# Patient Record
Sex: FEMALE | Race: WHITE | NOT HISPANIC OR LATINO | Employment: FULL TIME | RURAL
[De-identification: names, ages, dates, MRNs, and addresses within clinical notes are randomized per-mention and may not be internally consistent; named-entity substitution may affect disease eponyms.]

---

## 2020-04-13 ENCOUNTER — OFFICE VISIT (OUTPATIENT)
Dept: FAMILY MEDICINE CLINIC | Facility: CLINIC | Age: 25
End: 2020-04-13

## 2020-04-13 VITALS
TEMPERATURE: 97.8 F | BODY MASS INDEX: 34.97 KG/M2 | OXYGEN SATURATION: 98 % | DIASTOLIC BLOOD PRESSURE: 77 MMHG | HEIGHT: 64 IN | RESPIRATION RATE: 18 BRPM | HEART RATE: 64 BPM | WEIGHT: 204.8 LBS | SYSTOLIC BLOOD PRESSURE: 114 MMHG

## 2020-04-13 DIAGNOSIS — F41.9 ANXIETY: Primary | ICD-10-CM

## 2020-04-13 DIAGNOSIS — E66.3 OVERWEIGHT: ICD-10-CM

## 2020-04-13 DIAGNOSIS — F17.200 TOBACCO USE DISORDER: ICD-10-CM

## 2020-04-13 PROCEDURE — 99203 OFFICE O/P NEW LOW 30 MIN: CPT | Performed by: FAMILY MEDICINE

## 2020-04-13 RX ORDER — BUSPIRONE HYDROCHLORIDE 7.5 MG/1
TABLET ORAL
Qty: 90 TABLET | Refills: 2 | Status: SHIPPED | OUTPATIENT
Start: 2020-04-13 | End: 2020-06-03 | Stop reason: SDUPTHER

## 2020-04-13 RX ORDER — PROPRANOLOL HYDROCHLORIDE 10 MG/1
10 TABLET ORAL AS NEEDED
COMMUNITY
End: 2020-06-03 | Stop reason: SDUPTHER

## 2020-04-13 NOTE — PROGRESS NOTES
Subjective   Talisha Hernandes is a 24 y.o. female.     Chief Complaint   Patient presents with   • Anxiety       Anxiety   Presents for initial visit. Onset was more than 5 years ago. The problem has been unchanged. Symptoms include nervous/anxious behavior and panic. Patient reports no chest pain, nausea, palpitations, shortness of breath or suicidal ideas. Symptoms occur most days. The patient sleeps 5 hours per night. The quality of sleep is poor. Nighttime awakenings: occasional.     Her past medical history is significant for anxiety/panic attacks. The treatment provided mild relief. Compliance with prior treatments has been good.            I personally reviewed and updated the patient's allergies, medications, problem list, and past medical, surgical, social, and family history.     History reviewed. No pertinent family history.    Social History     Tobacco Use   • Smoking status: Light Tobacco Smoker     Types: Cigarettes   • Smokeless tobacco: Never Used   Substance Use Topics   • Alcohol use: Yes     Frequency: 2-4 times a month     Drinks per session: 1 or 2   • Drug use: Never       Past Surgical History:   Procedure Laterality Date   • TONSILLECTOMY         Patient Active Problem List   Diagnosis   • Anxiety   • Overweight   • Tobacco use disorder         Current Outpatient Medications:   •  propranolol (INDERAL) 10 MG tablet, Take 10 mg by mouth As Needed., Disp: , Rfl:   •  busPIRone (BUSPAR) 7.5 MG tablet, Take 1 tablet 2 to 3 times daily, Disp: 90 tablet, Rfl: 2         Review of Systems   Constitutional: Negative for chills and diaphoresis.   Eyes: Negative for visual disturbance.   Respiratory: Negative for shortness of breath.    Cardiovascular: Negative for chest pain and palpitations.   Gastrointestinal: Negative for abdominal pain and nausea.   Endocrine: Negative for polydipsia and polyphagia.   Musculoskeletal: Negative for neck stiffness.   Skin: Negative for color change and pallor.  "  Neurological: Negative for seizures and syncope.   Hematological: Negative for adenopathy.   Psychiatric/Behavioral: Negative for hallucinations and suicidal ideas. The patient is nervous/anxious.        I have reviewed and confirmed the accuracy of the ROS as documented by the MA/LPN/RN Chad Mcgee MD      Objective   /77   Pulse 64   Temp 97.8 °F (36.6 °C)   Resp 18   Ht 162.6 cm (64\")   Wt 92.9 kg (204 lb 12.8 oz)   LMP 04/10/2020   SpO2 98%   Breastfeeding No   BMI 35.15 kg/m²   Wt Readings from Last 3 Encounters:   04/13/20 92.9 kg (204 lb 12.8 oz)     Physical Exam   Constitutional: She is oriented to person, place, and time. She appears well-developed and well-nourished.   Cardiovascular: Normal rate, regular rhythm, S1 normal, S2 normal, normal heart sounds, intact distal pulses and normal pulses. Exam reveals no gallop and no friction rub.   No murmur heard.  Pulmonary/Chest: Effort normal and breath sounds normal. No accessory muscle usage or stridor. She has no decreased breath sounds. She has no wheezes. She has no rhonchi. She has no rales.   Abdominal: Soft. Normal appearance, normal aorta and bowel sounds are normal. She exhibits no distension, no pulsatile midline mass and no mass. There is no hepatosplenomegaly. There is no tenderness. There is no rigidity, no rebound, no guarding, no CVA tenderness and negative James's sign. No hernia.   Neurological: She is alert and oriented to person, place, and time. Coordination and gait normal.   Skin: Skin is warm and dry. Turgor is normal. She is not diaphoretic. No pallor.         Assessment/Plan       Anxiety.  Improved on as needed propranolol, add BuSpar.  History of depression resolved currently.  Good social support.  Recently moved area, restart counseling.  Follow-up recheck.  Call or return if worsening symptoms.  Check blood work.  Screening for cervical cancer.  On OCPs, has upcoming appointment with OB/GYN.  Tobacco use.  " Encouraged cessation.  Screening for hyperlipidemia.  Check fasting blood work.  Follow-up visit schedule comprehensive physical and screening tests.    Problem List Items Addressed This Visit        Unprioritized    Anxiety - Primary    Overweight    Tobacco use disorder              Expected course, medications, and adverse effects discussed.  Call or return if worsening or persistent symptoms.

## 2020-06-02 PROBLEM — Z00.01 ANNUAL VISIT FOR GENERAL ADULT MEDICAL EXAMINATION WITH ABNORMAL FINDINGS: Status: ACTIVE | Noted: 2020-06-02

## 2020-06-02 NOTE — PROGRESS NOTES
Subjective   Talisha Hernandes is a 24 y.o. female.     Chief Complaint   Patient presents with   • Annual Exam       The patient is here: to discuss health maintenance and disease prevention to follow up on multiple medical problems.  Last comprehensive physical was on unknown.  Overall has: moderate activity with work/home activities, exercises 2 - 3 times per week, good appetite, feels well with no complaints, good energy level and is sleeping poorly. Nutrition: appropriate balanced diet, balanced diet and eating a variety of foods. Last tetanus shot was unknown.     Anxiety   Presents for initial visit. Onset was more than 5 years ago. The problem has been unchanged. Symptoms include nervous/anxious behavior and panic. Patient reports no chest pain, nausea, palpitations, shortness of breath or suicidal ideas. Symptoms occur most days. The patient sleeps 5 hours per night. The quality of sleep is poor. Nighttime awakenings: occasional.     Her past medical history is significant for anxiety/panic attacks. The treatment provided mild relief. Compliance with prior treatments has been good.        Recent Hospitalizations:  No hospitalization(s) within the last year..  ccc      I personally reviewed and updated the patient's allergies, medications, problem list, and past medical, surgical, social, and family history.     Family History   Problem Relation Age of Onset   • Cancer Mother         lymphoma   • Hypertension Father        Social History     Tobacco Use   • Smoking status: Former Smoker     Types: Cigarettes   • Smokeless tobacco: Never Used   Substance Use Topics   • Alcohol use: Yes     Frequency: 2-4 times a month     Drinks per session: 1 or 2   • Drug use: Never       Past Surgical History:   Procedure Laterality Date   • TONSILLECTOMY         Patient Active Problem List   Diagnosis   • Anxiety   • Overweight   • Tobacco use disorder   • Annual visit for general adult medical examination with abnormal  "findings   • Former light tobacco smoker         Current Outpatient Medications:   •  busPIRone (BUSPAR) 7.5 MG tablet, Take 1 tablet 2 to 3 times daily, Disp: 90 tablet, Rfl: 3  •  propranolol (INDERAL) 10 MG tablet, Take 1 tablet daily as needed, Disp: 90 tablet, Rfl: 3  •  tretinoin (RETIN-A) 0.025 % cream, APPLY TO FACE TWICE WEEKLY TO START AS DIRECTED FOR ACNE, Disp: , Rfl:   •  clindamycin (CLEOCIN T) 1 % external solution, , Disp: , Rfl:          Review of Systems   Constitutional: Negative for chills and diaphoresis.   Eyes: Negative for visual disturbance.   Respiratory: Negative for shortness of breath.    Cardiovascular: Negative for chest pain and palpitations.   Gastrointestinal: Negative for abdominal pain and nausea.   Endocrine: Negative for polydipsia and polyphagia.   Musculoskeletal: Negative for neck stiffness.   Skin: Negative for color change and pallor.   Neurological: Negative for seizures and syncope.   Hematological: Negative for adenopathy.   Psychiatric/Behavioral: Negative for hallucinations and suicidal ideas. The patient is nervous/anxious.        I have reviewed and confirmed the accuracy of the ROS as documented by the MA/LPN/RN Chad Mcgee MD      Objective   /76 (BP Location: Right arm, Patient Position: Sitting, Cuff Size: Adult)   Pulse 71   Temp 97.5 °F (36.4 °C)   Resp 18   Ht 162.6 cm (64\")   Wt 91.5 kg (201 lb 12.8 oz)   LMP 05/26/2020   SpO2 98%   BMI 34.64 kg/m²   Wt Readings from Last 3 Encounters:   06/03/20 91.5 kg (201 lb 12.8 oz)   04/13/20 92.9 kg (204 lb 12.8 oz)     Physical Exam   Constitutional: She is oriented to person, place, and time. She appears well-developed and well-nourished.   HENT:   Head: Normocephalic.   Right Ear: Tympanic membrane, external ear and ear canal normal.   Left Ear: Tympanic membrane, external ear and ear canal normal.   Nose: Nose normal.   Eyes: Pupils are equal, round, and reactive to light. Conjunctivae, EOM and " lids are normal.   Neck: No JVD present. Carotid bruit is not present. No tracheal deviation present. No thyromegaly present.   Cardiovascular: Normal rate, regular rhythm, normal heart sounds and intact distal pulses. Exam reveals no gallop and no friction rub.   No murmur heard.  Pulmonary/Chest: Effort normal and breath sounds normal. No stridor. She has no decreased breath sounds. She has no wheezes. She has no rales.   Abdominal: Soft. Bowel sounds are normal. She exhibits no distension and no mass. There is no tenderness. There is no rebound and no guarding. No hernia.   Lymphadenopathy:        Head (right side): No submental, no submandibular, no tonsillar, no preauricular, no posterior auricular and no occipital adenopathy present.        Head (left side): No submental, no submandibular, no tonsillar, no preauricular, no posterior auricular and no occipital adenopathy present.     She has no cervical adenopathy.   Neurological: She is alert and oriented to person, place, and time. She has normal strength and normal reflexes. No cranial nerve deficit or sensory deficit. Coordination and gait normal.   Skin: Skin is warm and dry. Turgor is normal. She is not diaphoretic. No pallor.       Recent Lab Results:    Lab Results   Component Value Date    GLU 84 06/03/2020        Lab Results   Component Value Date    TRIG 52 06/03/2020    HDL 75 06/03/2020    VLDL 10 06/03/2020     LDL Cholesterol    Date Value Ref Range Status   06/03/2020 73 0 - 99 mg/dL Final     No results found for: PSA  Lab Results   Component Value Date    WBC 7.6 06/03/2020    HGB 14.4 06/03/2020    HCT 43.3 06/03/2020    MCV 87 06/03/2020     06/03/2020     Lab Results   Component Value Date    TSH 0.757 06/03/2020     Lab Results   Component Value Date    BUN 13 06/03/2020    CREATININE 0.79 06/03/2020    EGFRIFNONA 105 06/03/2020    EGFRIFAFRI 121 06/03/2020    BCR 16 06/03/2020    K 4.5 06/03/2020    CO2 24 06/03/2020    CALCIUM 9.7  06/03/2020    PROTENTOTREF 7.3 06/03/2020    ALBUMIN 4.9 06/03/2020    LABIL2 2.0 06/03/2020    AST 19 06/03/2020    ALT 16 06/03/2020         Age-appropriate Screening Schedule:  Refer to the list below for future screening recommendations based on patient's age, sex and/or medical conditions. Orders for these recommended tests are listed in the plan section. The patient has been provided with a written plan.    Health Maintenance   Topic Date Due   • TDAP/TD VACCINES (1 - Tdap) 11/06/2006   • CHLAMYDIA SCREENING  03/20/2020   • PAP SMEAR  03/20/2020   • INFLUENZA VACCINE  08/01/2020           Assessment/Plan      Medications        Problem List         LOS    Physical.  Doing well, vaccines current.  Discussed health maintenance, screening test, lifestyle modification.  Anxiety.  Improved on as needed propranolol,  BuSpar.  History of depression resolved currently.  Good social support.  Recently moved area, restart counseling.  Follow-up recheck.  Call or return if worsening symptoms.  Check blood work.  Screening for cervical cancer.  On OCPs, has had appointment with OB/GYN.  Undergoing eval for PCOS, will get records.  Tobacco use.    Congrats on quitting.  Screening for hyperlipidemia.  Check fasting blood work.    Problem List Items Addressed This Visit        Unprioritized    Anxiety    Overweight    Annual visit for general adult medical examination with abnormal findings - Primary    Relevant Orders    POC Urinalysis Dipstick, Automated (Completed)    Former light tobacco smoker      Other Visit Diagnoses     Screening for hyperlipidemia        Relevant Orders    Lipid Panel With / Chol / HDL Ratio (Completed)    Malaise and fatigue        Relevant Orders    CBC & Differential (Completed)    Comprehensive Metabolic Panel (Completed)    TSH (Completed)              Expected course, medications, and adverse effects discussed.  Call or return if worsening or persistent symptoms.

## 2020-06-03 ENCOUNTER — OFFICE VISIT (OUTPATIENT)
Dept: FAMILY MEDICINE CLINIC | Facility: CLINIC | Age: 25
End: 2020-06-03

## 2020-06-03 VITALS
HEIGHT: 64 IN | DIASTOLIC BLOOD PRESSURE: 76 MMHG | WEIGHT: 201.8 LBS | OXYGEN SATURATION: 98 % | SYSTOLIC BLOOD PRESSURE: 111 MMHG | TEMPERATURE: 97.5 F | HEART RATE: 71 BPM | RESPIRATION RATE: 18 BRPM | BODY MASS INDEX: 34.45 KG/M2

## 2020-06-03 DIAGNOSIS — E66.3 OVERWEIGHT: ICD-10-CM

## 2020-06-03 DIAGNOSIS — R53.81 MALAISE AND FATIGUE: ICD-10-CM

## 2020-06-03 DIAGNOSIS — Z00.01 ANNUAL VISIT FOR GENERAL ADULT MEDICAL EXAMINATION WITH ABNORMAL FINDINGS: Primary | ICD-10-CM

## 2020-06-03 DIAGNOSIS — F41.9 ANXIETY: ICD-10-CM

## 2020-06-03 DIAGNOSIS — R53.83 MALAISE AND FATIGUE: ICD-10-CM

## 2020-06-03 DIAGNOSIS — Z87.891 FORMER LIGHT TOBACCO SMOKER: ICD-10-CM

## 2020-06-03 DIAGNOSIS — Z13.220 SCREENING FOR HYPERLIPIDEMIA: ICD-10-CM

## 2020-06-03 LAB
BILIRUB BLD-MCNC: NEGATIVE MG/DL
CLARITY, POC: CLEAR
COLOR UR: YELLOW
GLUCOSE UR STRIP-MCNC: NEGATIVE MG/DL
KETONES UR QL: NEGATIVE
LEUKOCYTE EST, POC: NEGATIVE
NITRITE UR-MCNC: NEGATIVE MG/ML
PH UR: 6.5 [PH] (ref 5–8)
PROT UR STRIP-MCNC: ABNORMAL MG/DL
RBC # UR STRIP: NEGATIVE /UL
SP GR UR: 1.01 (ref 1–1.03)
UROBILINOGEN UR QL: NORMAL

## 2020-06-03 PROCEDURE — 99395 PREV VISIT EST AGE 18-39: CPT | Performed by: FAMILY MEDICINE

## 2020-06-03 PROCEDURE — 81003 URINALYSIS AUTO W/O SCOPE: CPT | Performed by: FAMILY MEDICINE

## 2020-06-03 RX ORDER — CLINDAMYCIN PHOSPHATE 11.9 MG/ML
SOLUTION TOPICAL
COMMUNITY
Start: 2020-04-12 | End: 2022-08-01 | Stop reason: SDUPTHER

## 2020-06-03 RX ORDER — PROPRANOLOL HYDROCHLORIDE 10 MG/1
TABLET ORAL
Qty: 90 TABLET | Refills: 3 | Status: SHIPPED | OUTPATIENT
Start: 2020-06-03

## 2020-06-03 RX ORDER — BUSPIRONE HYDROCHLORIDE 7.5 MG/1
TABLET ORAL
Qty: 90 TABLET | Refills: 3 | Status: SHIPPED | OUTPATIENT
Start: 2020-06-03 | End: 2021-03-01 | Stop reason: SDUPTHER

## 2020-06-04 LAB
ALBUMIN SERPL-MCNC: 4.9 G/DL (ref 3.9–5)
ALBUMIN/GLOB SERPL: 2 {RATIO} (ref 1.2–2.2)
ALP SERPL-CCNC: 94 IU/L (ref 39–117)
ALT SERPL-CCNC: 16 IU/L (ref 0–32)
AST SERPL-CCNC: 19 IU/L (ref 0–40)
BASOPHILS # BLD AUTO: 0 X10E3/UL (ref 0–0.2)
BASOPHILS NFR BLD AUTO: 0 %
BILIRUB SERPL-MCNC: 1.3 MG/DL (ref 0–1.2)
BUN SERPL-MCNC: 13 MG/DL (ref 6–20)
BUN/CREAT SERPL: 16 (ref 9–23)
CALCIUM SERPL-MCNC: 9.7 MG/DL (ref 8.7–10.2)
CHLORIDE SERPL-SCNC: 100 MMOL/L (ref 96–106)
CHOLEST SERPL-MCNC: 158 MG/DL (ref 100–199)
CHOLEST/HDLC SERPL: 2.1 RATIO (ref 0–4.4)
CO2 SERPL-SCNC: 24 MMOL/L (ref 20–29)
CREAT SERPL-MCNC: 0.79 MG/DL (ref 0.57–1)
EOSINOPHIL # BLD AUTO: 0.1 X10E3/UL (ref 0–0.4)
EOSINOPHIL NFR BLD AUTO: 2 %
ERYTHROCYTE [DISTWIDTH] IN BLOOD BY AUTOMATED COUNT: 13.2 % (ref 11.7–15.4)
GLOBULIN SER CALC-MCNC: 2.4 G/DL (ref 1.5–4.5)
GLUCOSE SERPL-MCNC: 84 MG/DL (ref 65–99)
HCT VFR BLD AUTO: 43.3 % (ref 34–46.6)
HDLC SERPL-MCNC: 75 MG/DL
HGB BLD-MCNC: 14.4 G/DL (ref 11.1–15.9)
IMM GRANULOCYTES # BLD AUTO: 0 X10E3/UL (ref 0–0.1)
IMM GRANULOCYTES NFR BLD AUTO: 0 %
LDLC SERPL CALC-MCNC: 73 MG/DL (ref 0–99)
LYMPHOCYTES # BLD AUTO: 2.5 X10E3/UL (ref 0.7–3.1)
LYMPHOCYTES NFR BLD AUTO: 32 %
MCH RBC QN AUTO: 29 PG (ref 26.6–33)
MCHC RBC AUTO-ENTMCNC: 33.3 G/DL (ref 31.5–35.7)
MCV RBC AUTO: 87 FL (ref 79–97)
MONOCYTES # BLD AUTO: 0.4 X10E3/UL (ref 0.1–0.9)
MONOCYTES NFR BLD AUTO: 6 %
NEUTROPHILS # BLD AUTO: 4.6 X10E3/UL (ref 1.4–7)
NEUTROPHILS NFR BLD AUTO: 60 %
PLATELET # BLD AUTO: 271 X10E3/UL (ref 150–450)
POTASSIUM SERPL-SCNC: 4.5 MMOL/L (ref 3.5–5.2)
PROT SERPL-MCNC: 7.3 G/DL (ref 6–8.5)
RBC # BLD AUTO: 4.97 X10E6/UL (ref 3.77–5.28)
SODIUM SERPL-SCNC: 139 MMOL/L (ref 134–144)
TRIGL SERPL-MCNC: 52 MG/DL (ref 0–149)
TSH SERPL DL<=0.005 MIU/L-ACNC: 0.76 UIU/ML (ref 0.45–4.5)
VLDLC SERPL CALC-MCNC: 10 MG/DL (ref 5–40)
WBC # BLD AUTO: 7.6 X10E3/UL (ref 3.4–10.8)

## 2020-06-05 ENCOUNTER — TELEPHONE (OUTPATIENT)
Dept: FAMILY MEDICINE CLINIC | Facility: CLINIC | Age: 25
End: 2020-06-05

## 2020-06-05 NOTE — TELEPHONE ENCOUNTER
----- Message from Chad Mcgee MD sent at 6/4/2020  7:44 AM EDT -----  Let her know her blood work looks good, blood count, thyroid, cholesterol, everything normal, thanks

## 2021-03-01 RX ORDER — BUSPIRONE HYDROCHLORIDE 7.5 MG/1
TABLET ORAL
Qty: 90 TABLET | Refills: 3 | Status: SHIPPED | OUTPATIENT
Start: 2021-03-01 | End: 2021-06-16

## 2021-03-01 NOTE — TELEPHONE ENCOUNTER
Patient is requesting a refill on busPIRone (BUSPAR) 7.5 MG tablet    [facility] - [place], [place] - [address] - [phone]  - [phone] FX

## 2021-06-15 NOTE — PROGRESS NOTES
Subjective   Talisha Hernandes is a 25 y.o. female.     Chief Complaint   Patient presents with   • Annual Exam   • Anxiety       The patient is here: for coordination of medical care to discuss health maintenance and disease prevention to follow up on multiple medical problems.  Last comprehensive physical was on 6/3/2020.  Previous physical was performed by  Chad Mcgee MD  Overall has: moderate activity with work/home activities, exercises 2 - 3 times per week, good appetite, feels well with minor complaints, good energy level and is sleeping poorly. Nutrition: eating a variety of foods. Last tetanus shot was unknown.   Patient is doing routine self breast exams: occasionally   Last Completed Pap Smear     This patient has no relevant Health Maintenance data.          Anxiety  Presents for follow-up visit. Symptoms include depressed mood, insomnia, irritability, nausea, nervous/anxious behavior and shortness of breath. Patient reports no chest pain, decreased concentration, dizziness, palpitations or suicidal ideas. The severity of symptoms is moderate. The quality of sleep is fair. Nighttime awakenings: several.     Compliance with medications is %.        Recent Hospitalizations:  No hospitalization(s) within the last year..  ccc      I personally reviewed and updated the patient's allergies, medications, problem list, and past medical, surgical, social, and family history. I have reviewed and confirmed the accuracy of the HPI and ROS as documented by the MA/LPN/RN Chad Mcgee MD    Family History   Problem Relation Age of Onset   • Cancer Mother         lymphoma   • Hypertension Father        Social History     Tobacco Use   • Smoking status: Former Smoker     Types: Cigarettes     Start date: 2016   • Smokeless tobacco: Never Used   Vaping Use   • Vaping Use: Never used   Substance Use Topics   • Alcohol use: Yes     Comment: Occasional    • Drug use: Never       Past Surgical History:   Procedure  "Laterality Date   • TONSILLECTOMY         Patient Active Problem List   Diagnosis   • Anxiety   • Overweight   • Annual visit for general adult medical examination with abnormal findings   • Former tobacco use         Current Outpatient Medications:   •  busPIRone (BUSPAR) 15 MG tablet, Take 1 tablet 2 to 3 times daily, Disp: 180 tablet, Rfl: 3  •  clindamycin (CLEOCIN T) 1 % external solution, , Disp: , Rfl:   •  propranolol (INDERAL) 10 MG tablet, Take 1 tablet daily as needed, Disp: 90 tablet, Rfl: 3    Review of Systems   Constitutional: Positive for irritability. Negative for chills and diaphoresis.   HENT: Negative for trouble swallowing and voice change.    Eyes: Negative for visual disturbance.   Respiratory: Positive for shortness of breath.    Cardiovascular: Negative for chest pain and palpitations.   Gastrointestinal: Positive for nausea. Negative for abdominal pain.   Endocrine: Negative for polydipsia and polyphagia.   Genitourinary: Negative for hematuria.   Musculoskeletal: Negative for neck stiffness.   Skin: Negative for color change and pallor.   Allergic/Immunologic: Negative for immunocompromised state.   Neurological: Negative for dizziness, seizures and syncope.   Hematological: Negative for adenopathy.   Psychiatric/Behavioral: Positive for depressed mood. Negative for decreased concentration, hallucinations, sleep disturbance and suicidal ideas. The patient is nervous/anxious and has insomnia.        I have reviewed and confirmed the accuracy of the ROS as documented by the MA/LPN/RN Chad Mcgee MD      Objective   /70   Pulse 71   Temp 97.5 °F (36.4 °C)   Resp 16   Ht 162.6 cm (64\")   Wt 94 kg (207 lb 3.2 oz)   LMP 06/08/2021 (Approximate)   SpO2 99%   Breastfeeding No   BMI 35.57 kg/m²   BP Readings from Last 3 Encounters:   06/16/21 110/70   06/03/20 111/76   04/13/20 114/77     Wt Readings from Last 3 Encounters:   06/16/21 94 kg (207 lb 3.2 oz)   06/03/20 91.5 kg (201 " lb 12.8 oz)   04/13/20 92.9 kg (204 lb 12.8 oz)     Physical Exam  Constitutional:       Appearance: Normal appearance. She is well-developed. She is not diaphoretic.   Cardiovascular:      Rate and Rhythm: Normal rate and regular rhythm.      Pulses: Normal pulses.      Heart sounds: Normal heart sounds, S1 normal and S2 normal. No murmur heard.   No friction rub. No gallop.    Pulmonary:      Effort: Pulmonary effort is normal. No accessory muscle usage.      Breath sounds: Normal breath sounds. No stridor. No decreased breath sounds, wheezing, rhonchi or rales.   Abdominal:      General: Bowel sounds are normal. There is no distension.      Palpations: Abdomen is soft. Abdomen is not rigid. There is no mass or pulsatile mass.      Tenderness: There is no abdominal tenderness. There is no guarding or rebound. Negative signs include James's sign.      Hernia: No hernia is present.   Skin:     General: Skin is warm and dry.      Coloration: Skin is not pale.   Neurological:      Mental Status: She is alert and oriented to person, place, and time.      Coordination: Coordination normal.      Gait: Gait normal.         Data / Lab Results:    No results found for: HGBA1C  Lab Results   Component Value Date    GLU 84 06/03/2020     Lab Results   Component Value Date    LDL 73 06/03/2020     No results found for: CHOL  Lab Results   Component Value Date    TRIG 52 06/03/2020     Lab Results   Component Value Date    HDL 75 06/03/2020     No results found for: PSA  Lab Results   Component Value Date    WBC 7.6 06/03/2020    HGB 14.4 06/03/2020    HCT 43.3 06/03/2020    MCV 87 06/03/2020     06/03/2020     Lab Results   Component Value Date    TSH 0.757 06/03/2020      Lab Results   Component Value Date    BUN 13 06/03/2020    CREATININE 0.79 06/03/2020    EGFRIFNONA 105 06/03/2020    EGFRIFAFRI 121 06/03/2020    BCR 16 06/03/2020    K 4.5 06/03/2020    CO2 24 06/03/2020    CALCIUM 9.7 06/03/2020    PROTENTOTREF  7.3 06/03/2020    ALBUMIN 4.9 06/03/2020    LABIL2 2.0 06/03/2020    AST 19 06/03/2020    ALT 16 06/03/2020     No results found for: NESHA, RF, SEDRATE   No results found for: CRP   No results found for: IRON, TIBC, FERRITIN   No results found for: [id number]     Age-appropriate Screening Schedule:  Refer to the list below for future screening recommendations based on patient's age, sex and/or medical conditions. Orders for these recommended tests are listed in the plan section. The patient has been provided with a written plan.    Health Maintenance   Topic Date Due   • TDAP/TD VACCINES (1 - Tdap) Never done   • PAP SMEAR  Never done   • INFLUENZA VACCINE  08/01/2021           Assessment/Plan      Medications        Problem List         LOS    Physical.  Doing well, vaccines current.  Discussed health maintenance, screening test, lifestyle modification.  Anxiety.  Improved on as needed propranolol,  BuSpar, dose buspirone increased.  Sleep latency improved with melatonin..  History of depression resolved currently.  Good social support.  Recently moved area, restart counseling.  Follow-up recheck.  Call or return if worsening symptoms.  Check blood work.  Screening for cervical cancer.  On OCPs, followed by OB/GYN.   Has had negative eval for PCOS.  Tobacco use.    Congrats on quitting.  Screening for hyperlipidemia.  fasting blood work benign 2020..         Diagnoses and all orders for this visit:    1. Annual visit for general adult medical examination with abnormal findings (Primary)    2. Anxiety  -     busPIRone (BUSPAR) 15 MG tablet; Take 1 tablet 2 to 3 times daily  Dispense: 180 tablet; Refill: 3    3. Former tobacco use    4. Overweight              Expected course, medications, and adverse effects discussed.  Call or return if worsening or persistent symptoms.  I wore protective equipment throughout this patient encounter including a mask, gloves, and eye protection.  Hand hygiene was performed before  donning protective equipment and after removal when leaving the room. The complete contents of the Assessment and Plan and Data / Lab Results as documented above have been reviewed and addressed by myself with the patient today as part of an ongoing evaluation / treatment plan.  If some of the documentation has been copied from a previous note and is unchanged it indicates that this problem / plan has been assessed today but is stable from a previous visit and no changes have been recommended.

## 2021-06-16 ENCOUNTER — OFFICE VISIT (OUTPATIENT)
Dept: FAMILY MEDICINE CLINIC | Facility: CLINIC | Age: 26
End: 2021-06-16

## 2021-06-16 VITALS
SYSTOLIC BLOOD PRESSURE: 110 MMHG | RESPIRATION RATE: 16 BRPM | HEART RATE: 71 BPM | TEMPERATURE: 97.5 F | DIASTOLIC BLOOD PRESSURE: 70 MMHG | OXYGEN SATURATION: 99 % | HEIGHT: 64 IN | WEIGHT: 207.2 LBS | BODY MASS INDEX: 35.37 KG/M2

## 2021-06-16 DIAGNOSIS — Z87.891 FORMER TOBACCO USE: ICD-10-CM

## 2021-06-16 DIAGNOSIS — F41.9 ANXIETY: ICD-10-CM

## 2021-06-16 DIAGNOSIS — Z00.01 ANNUAL VISIT FOR GENERAL ADULT MEDICAL EXAMINATION WITH ABNORMAL FINDINGS: Primary | ICD-10-CM

## 2021-06-16 DIAGNOSIS — E66.3 OVERWEIGHT: ICD-10-CM

## 2021-06-16 PROBLEM — F17.200 TOBACCO USE DISORDER: Status: RESOLVED | Noted: 2020-04-13 | Resolved: 2021-06-16

## 2021-06-16 PROCEDURE — 99395 PREV VISIT EST AGE 18-39: CPT | Performed by: FAMILY MEDICINE

## 2021-06-16 RX ORDER — BUSPIRONE HYDROCHLORIDE 15 MG/1
TABLET ORAL
Qty: 180 TABLET | Refills: 3 | Status: SHIPPED | OUTPATIENT
Start: 2021-06-16 | End: 2022-08-04

## 2021-08-27 ENCOUNTER — TELEPHONE (OUTPATIENT)
Dept: FAMILY MEDICINE CLINIC | Facility: CLINIC | Age: 26
End: 2021-08-27

## 2021-08-27 NOTE — TELEPHONE ENCOUNTER
Caller: Talisha Hernandes    Relationship: Self    Best call back number: [phone]    What medication are you requesting: SLEEP AID    What are your current symptoms: SLEEPING ISSUES    How long have you been experiencing symptoms: SINCE HIGH SCHOOL    Have you had these symptoms before:    [] Yes  [x] No    Have you been treated for these symptoms before:   [] Yes  [x] No    If a prescription is needed, what is your preferred pharmacy and phone number:      [facility] - [place], [place] - [facility] [address] [facility] - [phone] [facility] [phone]   [phone]      Additional notes:  PATIENT REQUEST A CALL BACK TO DISCUSS SOMETHING TO BE PRESCRIBED FOR SLEEPING PROBLEMS.

## 2022-01-25 NOTE — PROGRESS NOTES
Subjective   Talisha Hernandes is a 26 y.o. female.     Chief Complaint   Patient presents with   • Fatigue       Fatigue  This is a recurrent problem. The current episode started more than 1 month ago. The problem has been gradually worsening. Associated symptoms include fatigue and headaches. Pertinent negatives include no abdominal pain, arthralgias, change in bowel habit, chest pain, chills, congestion, coughing, diaphoresis, fever, nausea, sore throat or vomiting. Associated symptoms comments: Bruising, weight gain   Shortness of breath   . The symptoms are aggravated by exertion. She has tried rest and eating for the symptoms. The treatment provided no relief.            I personally reviewed and updated the patient's allergies, medications, problem list, and past medical, surgical, social, and family history. I have reviewed and confirmed the accuracy of the History of Present Illness and Review of Symptoms as documented by the MA/LPN/RN. Chad Mcgee MD    Family History   Problem Relation Age of Onset   • Cancer Mother         lymphoma   • Hypertension Father        Social History     Tobacco Use   • Smoking status: Former Smoker     Packs/day: 0.00     Years: 0.00     Pack years: 0.00     Types: Cigarettes     Start date: 2016   • Smokeless tobacco: Never Used   Vaping Use   • Vaping Use: Never used   Substance Use Topics   • Alcohol use: Yes     Alcohol/week: 0.0 standard drinks     Comment: Occasional    • Drug use: Never       Past Surgical History:   Procedure Laterality Date   • TONSILLECTOMY         Patient Active Problem List   Diagnosis   • Anxiety   • Class 2 severe obesity due to excess calories with serious comorbidity and body mass index (BMI) of 39.0 to 39.9 in adult (HCC)   • Annual visit for general adult medical examination with abnormal findings   • Former tobacco use   • Other fatigue         Current Outpatient Medications:   •  busPIRone (BUSPAR) 15 MG tablet, Take 1 tablet 2 to 3 times  "daily, Disp: 180 tablet, Rfl: 3  •  clindamycin (CLEOCIN T) 1 % external solution, , Disp: , Rfl:   •  propranolol (INDERAL) 10 MG tablet, Take 1 tablet daily as needed, Disp: 90 tablet, Rfl: 3  •  Viibryd 20 MG tablet tablet, Take 20 mg by mouth Daily., Disp: , Rfl:          Review of Systems   Constitutional: Positive for fatigue. Negative for chills, diaphoresis and fever.   HENT: Negative for congestion and sore throat.    Eyes: Negative for visual disturbance.   Respiratory: Negative for cough and shortness of breath.    Cardiovascular: Negative for chest pain and palpitations.   Gastrointestinal: Negative for abdominal pain, change in bowel habit, nausea and vomiting.   Endocrine: Negative for polydipsia and polyphagia.   Genitourinary: Positive for menstrual problem.   Musculoskeletal: Negative for arthralgias and neck stiffness.   Skin: Negative for color change and pallor.   Neurological: Negative for seizures and syncope.   Hematological: Negative for adenopathy.   Psychiatric/Behavioral: Negative for hallucinations and suicidal ideas.       I have reviewed and confirmed the accuracy of the ROS as documented by the MA/LPN/RN Chad Mcgee MD      Objective   /82   Pulse 86   Temp 97.3 °F (36.3 °C)   Resp 18   Ht 162.6 cm (64\")   Wt 103 kg (227 lb 6.4 oz)   SpO2 98%   Breastfeeding No   BMI 39.03 kg/m²   BP Readings from Last 3 Encounters:   01/26/22 122/82   06/16/21 110/70   06/03/20 111/76     Wt Readings from Last 3 Encounters:   01/26/22 103 kg (227 lb 6.4 oz)   06/16/21 94 kg (207 lb 3.2 oz)   06/03/20 91.5 kg (201 lb 12.8 oz)     Physical Exam  Constitutional:       Appearance: Normal appearance. She is well-developed. She is not diaphoretic.   Cardiovascular:      Rate and Rhythm: Normal rate and regular rhythm.      Pulses: Normal pulses.      Heart sounds: Normal heart sounds, S1 normal and S2 normal. No murmur heard.  No friction rub. No gallop.    Pulmonary:      Effort: Pulmonary " effort is normal. No accessory muscle usage.      Breath sounds: Normal breath sounds. No stridor. No decreased breath sounds, wheezing, rhonchi or rales.   Abdominal:      General: Bowel sounds are normal. There is no distension.      Palpations: Abdomen is soft. Abdomen is not rigid. There is no mass or pulsatile mass.      Tenderness: There is no abdominal tenderness. There is no guarding or rebound. Negative signs include James's sign.      Hernia: No hernia is present.   Skin:     General: Skin is warm and dry.      Coloration: Skin is not pale.   Neurological:      Mental Status: She is alert and oriented to person, place, and time.      Coordination: Coordination normal.      Gait: Gait normal.         Data / Lab Results:    No results found for: HGBA1C     Lab Results   Component Value Date    LDL 85 01/27/2022    LDL 73 06/03/2020     No results found for: CHOL  Lab Results   Component Value Date    TRIG 38 01/27/2022    TRIG 52 06/03/2020     Lab Results   Component Value Date    HDL 76 01/27/2022    HDL 75 06/03/2020     No results found for: PSA  Lab Results   Component Value Date    WBC 9.0 01/27/2022    HGB 14.1 01/27/2022    HCT 41.7 01/27/2022    MCV 87 01/27/2022     01/27/2022     Lab Results   Component Value Date    TSH 1.210 01/27/2022      Lab Results   Component Value Date    GLUCOSE 88 01/27/2022    BUN 10 01/27/2022    CREATININE 0.68 01/27/2022    EGFRIFNONA 121 01/27/2022    EGFRIFAFRI 140 01/27/2022    BCR 15 01/27/2022    K 4.4 01/27/2022    CO2 23 01/27/2022    CALCIUM 9.4 01/27/2022    PROTENTOTREF 6.7 01/27/2022    ALBUMIN 4.5 01/27/2022    LABIL2 2.0 01/27/2022    AST 20 01/27/2022    ALT 20 01/27/2022     No results found for: ENSHA, RF, SEDRATE   No results found for: CRP   No results found for: IRON, TIBC, FERRITIN   Lab Results   Component Value Date    [id number] 455 01/27/2022          Assessment & Plan      Medications        Problem List         [facility]  maintenance.  Doing well, vaccines current.  Discussed health maintenance, screening test, lifestyle modification.  Anxiety/depression..  Improved on as needed propranolol,  BuSpar, Viibryd, followed by psychiatry.  Sleep latency improved with melatonin.. Prior history of depression, current.  Screening for cervical cancer.  followed by OB/GYN, has Mirena..   Has had negative eval for PCOS.    Tobacco use.    Congrats on quitting.  Screening for hyperlipidemia.  Recheck fasting labs.  Fatigue.  Check blood work.  Has psychiatry follow-up upcoming, consider add Wellbutrin.  DDx includes RALPH, consider sleep study if persistent symptoms.  Follow-up recheck.  Easy bruising.  Benign exam today.  Check CBC.        Diagnoses and all orders for this visit:    1. Other fatigue (Primary)    2. Former tobacco use    3. Class 2 severe obesity due to excess calories with serious comorbidity and body mass index (BMI) of 39.0 to 39.9 in adult ([facility])  Assessment & Plan:  Patient's (Body mass index is 39.03 kg/m².) indicates that they are obese (BMI >30) with health conditions that include none . Weight is unchanged. BMI is is above average; BMI management plan is completed. We discussed portion control and increasing exercise.       4. Screening for hyperlipidemia  -     Lipid Panel With / Chol / HDL Ratio    5. Malaise and fatigue  -     CBC & Differential  -     Comprehensive Metabolic Panel  -     TSH  -     T4, Free    6. Vitamin D deficiency  -     Vitamin D 25 Hydroxy    7. Anemia, unspecified type  -     CBC & Differential  -     Folate  -     Vitamin B12    8. Thyroid disorder  -     TSH  -     T4, Free    9. Anxiety            Expected course, medications, and adverse effects discussed.  Call or return if worsening or persistent symptoms.  I wore protective equipment throughout this patient encounter including a mask, gloves, and eye protection.  Hand hygiene was performed before donning protective equipment and after  removal when leaving the room. The complete contents of the Assessment and Plan and Data/Lab Results as documented above have been reviewed and addressed by myself with the patient today as part of an ongoing evaluation / treatment plan.  If some of the documentation has been copied from a previous note and is unchanged it indicates that this problem / plan has been assessed today but is stable from a previous visit and no changes have been recommended.

## 2022-01-26 ENCOUNTER — OFFICE VISIT (OUTPATIENT)
Dept: FAMILY MEDICINE CLINIC | Facility: CLINIC | Age: 27
End: 2022-01-26

## 2022-01-26 VITALS
TEMPERATURE: 97.3 F | HEART RATE: 86 BPM | DIASTOLIC BLOOD PRESSURE: 82 MMHG | HEIGHT: 64 IN | WEIGHT: 227.4 LBS | OXYGEN SATURATION: 98 % | SYSTOLIC BLOOD PRESSURE: 122 MMHG | RESPIRATION RATE: 18 BRPM | BODY MASS INDEX: 38.82 KG/M2

## 2022-01-26 DIAGNOSIS — R53.83 OTHER FATIGUE: Primary | ICD-10-CM

## 2022-01-26 DIAGNOSIS — Z13.220 SCREENING FOR HYPERLIPIDEMIA: ICD-10-CM

## 2022-01-26 DIAGNOSIS — R53.81 MALAISE AND FATIGUE: ICD-10-CM

## 2022-01-26 DIAGNOSIS — R53.83 MALAISE AND FATIGUE: ICD-10-CM

## 2022-01-26 DIAGNOSIS — E07.9 THYROID DISORDER: ICD-10-CM

## 2022-01-26 DIAGNOSIS — E66.01 CLASS 2 SEVERE OBESITY DUE TO EXCESS CALORIES WITH SERIOUS COMORBIDITY AND BODY MASS INDEX (BMI) OF 39.0 TO 39.9 IN ADULT: ICD-10-CM

## 2022-01-26 DIAGNOSIS — E55.9 VITAMIN D DEFICIENCY: ICD-10-CM

## 2022-01-26 DIAGNOSIS — D64.9 ANEMIA, UNSPECIFIED TYPE: ICD-10-CM

## 2022-01-26 DIAGNOSIS — F41.9 ANXIETY: ICD-10-CM

## 2022-01-26 DIAGNOSIS — Z87.891 FORMER TOBACCO USE: ICD-10-CM

## 2022-01-26 PROCEDURE — 99213 OFFICE O/P EST LOW 20 MIN: CPT | Performed by: FAMILY MEDICINE

## 2022-01-26 RX ORDER — VILAZODONE HYDROCHLORIDE 20 MG/1
20 TABLET ORAL DAILY
COMMUNITY
Start: 2022-01-22 | End: 2022-08-01 | Stop reason: SDUPTHER

## 2022-01-28 LAB
25(OH)D3+25(OH)D2 SERPL-MCNC: 37.6 NG/ML (ref 30–100)
ALBUMIN SERPL-MCNC: 4.5 G/DL (ref 3.9–5)
ALBUMIN/GLOB SERPL: 2 {RATIO} (ref 1.2–2.2)
ALP SERPL-CCNC: 79 IU/L (ref 44–121)
ALT SERPL-CCNC: 20 IU/L (ref 0–32)
AST SERPL-CCNC: 20 IU/L (ref 0–40)
BASOPHILS # BLD AUTO: 0 X10E3/UL (ref 0–0.2)
BASOPHILS NFR BLD AUTO: 0 %
BILIRUB SERPL-MCNC: 1.1 MG/DL (ref 0–1.2)
BUN SERPL-MCNC: 10 MG/DL (ref 6–20)
BUN/CREAT SERPL: 15 (ref 9–23)
CALCIUM SERPL-MCNC: 9.4 MG/DL (ref 8.7–10.2)
CHLORIDE SERPL-SCNC: 102 MMOL/L (ref 96–106)
CHOLEST SERPL-MCNC: 169 MG/DL (ref 100–199)
CHOLEST/HDLC SERPL: 2.2 RATIO (ref 0–4.4)
CO2 SERPL-SCNC: 23 MMOL/L (ref 20–29)
CREAT SERPL-MCNC: 0.68 MG/DL (ref 0.57–1)
EOSINOPHIL # BLD AUTO: 0.1 X10E3/UL (ref 0–0.4)
EOSINOPHIL NFR BLD AUTO: 1 %
ERYTHROCYTE [DISTWIDTH] IN BLOOD BY AUTOMATED COUNT: 11.6 % (ref 11.7–15.4)
FOLATE SERPL-MCNC: >20 NG/ML
GLOBULIN SER CALC-MCNC: 2.2 G/DL (ref 1.5–4.5)
GLUCOSE SERPL-MCNC: 88 MG/DL (ref 65–99)
HCT VFR BLD AUTO: 41.7 % (ref 34–46.6)
HDLC SERPL-MCNC: 76 MG/DL
HGB BLD-MCNC: 14.1 G/DL (ref 11.1–15.9)
IMM GRANULOCYTES # BLD AUTO: 0 X10E3/UL (ref 0–0.1)
IMM GRANULOCYTES NFR BLD AUTO: 0 %
LDLC SERPL CALC-MCNC: 85 MG/DL (ref 0–99)
LYMPHOCYTES # BLD AUTO: 2 X10E3/UL (ref 0.7–3.1)
LYMPHOCYTES NFR BLD AUTO: 22 %
MCH RBC QN AUTO: 29.6 PG (ref 26.6–33)
MCHC RBC AUTO-ENTMCNC: 33.8 G/DL (ref 31.5–35.7)
MCV RBC AUTO: 87 FL (ref 79–97)
MONOCYTES # BLD AUTO: 0.7 X10E3/UL (ref 0.1–0.9)
MONOCYTES NFR BLD AUTO: 7 %
NEUTROPHILS # BLD AUTO: 6.2 X10E3/UL (ref 1.4–7)
NEUTROPHILS NFR BLD AUTO: 70 %
PLATELET # BLD AUTO: 268 X10E3/UL (ref 150–450)
POTASSIUM SERPL-SCNC: 4.4 MMOL/L (ref 3.5–5.2)
PROT SERPL-MCNC: 6.7 G/DL (ref 6–8.5)
RBC # BLD AUTO: 4.77 X10E6/UL (ref 3.77–5.28)
SODIUM SERPL-SCNC: 137 MMOL/L (ref 134–144)
T4 FREE SERPL-MCNC: 1.03 NG/DL (ref 0.82–1.77)
TRIGL SERPL-MCNC: 38 MG/DL (ref 0–149)
TSH SERPL-ACNC: 1.21 UIU/ML (ref 0.45–4.5)
VIT B12 SERPL-MCNC: 455 PG/ML (ref 232–1245)
VLDLC SERPL CALC-MCNC: 8 MG/DL (ref 5–40)
WBC # BLD AUTO: 9 X10E3/UL (ref 3.4–10.8)

## 2022-06-14 PROBLEM — E66.01 CLASS 2 SEVERE OBESITY DUE TO EXCESS CALORIES WITH SERIOUS COMORBIDITY AND BODY MASS INDEX (BMI) OF 39.0 TO 39.9 IN ADULT: Status: ACTIVE | Noted: 2020-04-13

## 2022-08-01 ENCOUNTER — OFFICE VISIT (OUTPATIENT)
Dept: FAMILY MEDICINE CLINIC | Facility: CLINIC | Age: 27
End: 2022-08-01

## 2022-08-01 VITALS
HEIGHT: 64 IN | BODY MASS INDEX: 38.1 KG/M2 | WEIGHT: 223.2 LBS | RESPIRATION RATE: 20 BRPM | DIASTOLIC BLOOD PRESSURE: 82 MMHG | OXYGEN SATURATION: 99 % | TEMPERATURE: 98 F | SYSTOLIC BLOOD PRESSURE: 120 MMHG | HEART RATE: 73 BPM

## 2022-08-01 DIAGNOSIS — Z00.01 ANNUAL VISIT FOR GENERAL ADULT MEDICAL EXAMINATION WITH ABNORMAL FINDINGS: Primary | ICD-10-CM

## 2022-08-01 DIAGNOSIS — Z87.891 FORMER TOBACCO USE: ICD-10-CM

## 2022-08-01 DIAGNOSIS — E66.01 CLASS 2 SEVERE OBESITY DUE TO EXCESS CALORIES WITH SERIOUS COMORBIDITY AND BODY MASS INDEX (BMI) OF 39.0 TO 39.9 IN ADULT: ICD-10-CM

## 2022-08-01 DIAGNOSIS — F41.9 ANXIETY: ICD-10-CM

## 2022-08-01 DIAGNOSIS — R21 RASH: ICD-10-CM

## 2022-08-01 PROCEDURE — 99395 PREV VISIT EST AGE 18-39: CPT | Performed by: FAMILY MEDICINE

## 2022-08-01 PROCEDURE — 99213 OFFICE O/P EST LOW 20 MIN: CPT | Performed by: FAMILY MEDICINE

## 2022-08-01 RX ORDER — CLINDAMYCIN PHOSPHATE 11.9 MG/ML
SOLUTION TOPICAL DAILY PRN
Qty: 60 ML | Refills: 5 | Status: SHIPPED | OUTPATIENT
Start: 2022-08-01

## 2022-08-01 RX ORDER — BUPROPION HYDROCHLORIDE 150 MG/1
150 TABLET ORAL DAILY
Qty: 30 TABLET | Refills: 12 | Status: SHIPPED | OUTPATIENT
Start: 2022-08-01 | End: 2022-11-28 | Stop reason: SDUPTHER

## 2022-08-01 RX ORDER — VILAZODONE HYDROCHLORIDE 20 MG/1
20 TABLET ORAL DAILY
Qty: 30 TABLET | Refills: 5 | Status: SHIPPED | OUTPATIENT
Start: 2022-08-01 | End: 2022-10-07 | Stop reason: SDUPTHER

## 2022-08-01 NOTE — ASSESSMENT & PLAN NOTE
Class 2 Severe Obesity (BMI >=35 and <=39.9). Obesity-related health conditions include the following: none. Obesity is unchanged. BMI is is above average; BMI management plan is completed. We discussed portion control and increasing exercise.

## 2022-08-01 NOTE — PROGRESS NOTES
Subjective   Talisha Hernandes is a 26 y.o. female.     Chief Complaint   Patient presents with   • Annual Exam       The patient is here: for coordination of medical care to discuss health maintenance and disease prevention to follow up on multiple medical problems.  Last comprehensive physical was on 2021.  Previous physical was performed by  Chad Mcgee MD  Overall has: moderate activity with work/home activities, exercises 2 - 3 times per week, good appetite, feels well with minor complaints, good energy level and is sleeping well. Nutrition: balanced diet and supplemental vitamins. Last tetanus shot was >10 years ago.   Patient is doing routine self breast exams: occasionally       Anxiety  Presents for follow-up visit. Symptoms include depressed mood, insomnia, irritability, nausea, nervous/anxious behavior and shortness of breath. Patient reports no chest pain, decreased concentration, dizziness, palpitations or suicidal ideas. The severity of symptoms is moderate. The quality of sleep is fair. Nighttime awakenings: several.     Compliance with medications is %.        Recent Hospitalizations:  No hospitalization(s) within the last year..  ccc      I personally reviewed and updated the patient's allergies, medications, problem list, and past medical, surgical, social, and family history. I have reviewed and confirmed the accuracy of the HPI and ROS as documented by the MA/LPN/RN Chad Mcgee MD    Family History   Problem Relation Age of Onset   • Cancer Mother         lymphoma   • Hypertension Father        Social History     Tobacco Use   • Smoking status: Former Smoker     Packs/day: 0.00     Years: 0.00     Pack years: 0.00     Types: Cigarettes     Start date:      Quit date: 2016     Years since quittin.6   • Smokeless tobacco: Never Used   • Tobacco comment: smoked one cigarette twice a month   Vaping Use   • Vaping Use: Never used   Substance Use Topics   • Alcohol use: Yes      Comment: Occasional    • Drug use: Never       Past Surgical History:   Procedure Laterality Date   • TONSILLECTOMY         Patient Active Problem List   Diagnosis   • Anxiety   • Class 2 severe obesity due to excess calories with serious comorbidity and body mass index (BMI) of 39.0 to 39.9 in adult (HCC)   • Annual visit for general adult medical examination with abnormal findings   • Former tobacco use   • Other fatigue         Current Outpatient Medications:   •  clindamycin (CLEOCIN T) 1 % external solution, Apply  topically to the appropriate area as directed Daily As Needed (rash)., Disp: 60 mL, Rfl: 5  •  propranolol (INDERAL) 10 MG tablet, Take 1 tablet daily as needed, Disp: 90 tablet, Rfl: 3  •  Viibryd 20 MG tablet tablet, Take 1 tablet by mouth Daily., Disp: 30 tablet, Rfl: 5  •  buPROPion XL (WELLBUTRIN XL) 150 MG 24 hr tablet, Take 1 tablet by mouth Daily., Disp: 30 tablet, Rfl: 12  •  busPIRone (BUSPAR) 15 MG tablet, TAKE 1 TABLET 2-3 TIMES    DAILY, Disp: 180 tablet, Rfl: 3    Review of Systems   Constitutional: Positive for irritability. Negative for chills and diaphoresis.   HENT: Negative for trouble swallowing and voice change.    Eyes: Negative for visual disturbance.   Respiratory: Positive for shortness of breath.    Cardiovascular: Negative for chest pain and palpitations.   Gastrointestinal: Positive for nausea. Negative for abdominal pain.   Endocrine: Negative for polydipsia and polyphagia.   Genitourinary: Negative for hematuria.   Musculoskeletal: Negative for neck stiffness.   Skin: Negative for color change and pallor.   Allergic/Immunologic: Negative for immunocompromised state.   Neurological: Negative for dizziness, seizures and syncope.   Hematological: Negative for adenopathy.   Psychiatric/Behavioral: Positive for depressed mood. Negative for decreased concentration, hallucinations, sleep disturbance and suicidal ideas. The patient is nervous/anxious and has insomnia.        I  "have reviewed and confirmed the accuracy of the ROS as documented by the MA/LPN/RN Chad Mcgee MD      Objective   /82   Pulse 73   Temp 98 °F (36.7 °C)   Resp 20   Ht 162.6 cm (64.02\")   Wt 101 kg (223 lb 3.2 oz)   SpO2 99%   BMI 38.29 kg/m²   BP Readings from Last 3 Encounters:   08/01/22 120/82   01/26/22 122/82   06/16/21 110/70     Wt Readings from Last 3 Encounters:   08/01/22 101 kg (223 lb 3.2 oz)   01/26/22 103 kg (227 lb 6.4 oz)   06/16/21 94 kg (207 lb 3.2 oz)     Physical Exam    Data / Lab Results:    No results found for: HGBA1C     Lab Results   Component Value Date    LDL 85 01/27/2022    LDL 73 06/03/2020     No results found for: CHOL  Lab Results   Component Value Date    TRIG 38 01/27/2022    TRIG 52 06/03/2020     Lab Results   Component Value Date    HDL 76 01/27/2022    HDL 75 06/03/2020     No results found for: PSA  Lab Results   Component Value Date    WBC 9.0 01/27/2022    HGB 14.1 01/27/2022    HCT 41.7 01/27/2022    MCV 87 01/27/2022     01/27/2022     Lab Results   Component Value Date    TSH 1.210 01/27/2022      Lab Results   Component Value Date    GLUCOSE 88 01/27/2022    BUN 10 01/27/2022    CREATININE 0.68 01/27/2022    EGFRIFNONA 121 01/27/2022    EGFRIFAFRI 140 01/27/2022    BCR 15 01/27/2022    K 4.4 01/27/2022    CO2 23 01/27/2022    CALCIUM 9.4 01/27/2022    PROTENTOTREF 6.7 01/27/2022    ALBUMIN 4.5 01/27/2022    LABIL2 2.0 01/27/2022    AST 20 01/27/2022    ALT 20 01/27/2022     No results found for: NESHA, RF, SEDRATE   No results found for: CRP   No results found for: IRON, TIBC, FERRITIN   Lab Results   Component Value Date    [id number] 455 01/27/2022        Age-appropriate Screening Schedule:  Refer to the list below for future screening recommendations based on patient's age, sex and/or medical conditions. Orders for these recommended tests are listed in the plan section. The patient has been provided with a written plan.    Health Maintenance "   Topic Date Due   • PAP SMEAR  Never done   • TDAP/TD VACCINES (1 - Tdap) 01/26/2023 (Originally 11/6/2014)   • INFLUENZA VACCINE  10/01/2022           Assessment & Plan      Medications        Problem List         LOS    Physical.  Doing well, vaccines current.  Discussed health maintenance, screening test, lifestyle modification.  Anxiety/depression..  Improved on as needed propranolol,  BuSpar, Viibryd, followed by psychiatry in the past.  Sleep latency improved with melatonin..   Add Wellbutrin.  Screening for cervical cancer.  followed by OB/GYN, has had Mirena in the past..   Has had negative eval for PCOS.    Tobacco use.    Congrats on quitting.  Screening for hyperlipidemia.  Recheck fasting labs.  Fatigue.    Improved with taking Viibryd at night.  DDx includes RALPH, consider sleep study if recurrantsymptoms.  Follow-up recheck.  Easy bruising.  Benign exam today.  CBC benign.  Acne.  Stable on clindamycin.  Obesity.  Has been working hard on diet and exercise.  Add Wellbutrin.  Follow-up recheck weight 4 to 6 months..         Diagnoses and all orders for this visit:    1. Annual visit for general adult medical examination with abnormal findings (Primary)    2. Rash  -     clindamycin (CLEOCIN T) 1 % external solution; Apply  topically to the appropriate area as directed Daily As Needed (rash).  Dispense: 60 mL; Refill: 5    3. Anxiety  -     Viibryd 20 MG tablet tablet; Take 1 tablet by mouth Daily.  Dispense: 30 tablet; Refill: 5  -     buPROPion XL (WELLBUTRIN XL) 150 MG 24 hr tablet; Take 1 tablet by mouth Daily.  Dispense: 30 tablet; Refill: 12    4. Class 2 severe obesity due to excess calories with serious comorbidity and body mass index (BMI) of 39.0 to 39.9 in adult ([facility])  Assessment & Plan:  Class 2 Severe Obesity (BMI >=35 and <=39.9). Obesity-related health conditions include the following: none. Obesity is unchanged. BMI is is above average; BMI management plan is completed. We discussed  portion control and increasing exercise.        5. Former tobacco use    Other orders  -     Cancel: Tdap Vaccine Greater Than or Equal To 8yo IM            Expected course, medications, and adverse effects discussed.  Call or return if worsening or persistent symptoms.  I wore protective equipment throughout this patient encounter including a mask, gloves, and eye protection.  Hand hygiene was performed before donning protective equipment and after removal when leaving the room. The complete contents of the Assessment and Plan and Data / Lab Results as documented above have been reviewed and addressed by myself with the patient today as part of an ongoing evaluation / treatment plan.  If some of the documentation has been copied from a previous note and is unchanged it indicates that this problem / plan has been assessed today but is stable from a previous visit and no changes have been recommended.

## 2022-08-03 DIAGNOSIS — F41.9 ANXIETY: ICD-10-CM

## 2022-08-04 DIAGNOSIS — F41.9 ANXIETY: ICD-10-CM

## 2022-08-04 RX ORDER — BUSPIRONE HYDROCHLORIDE 15 MG/1
TABLET ORAL
Qty: 180 TABLET | Refills: 3 | Status: SHIPPED | OUTPATIENT
Start: 2022-08-04 | End: 2022-10-07 | Stop reason: SDUPTHER

## 2022-10-06 ENCOUNTER — TELEPHONE (OUTPATIENT)
Dept: FAMILY MEDICINE CLINIC | Facility: CLINIC | Age: 27
End: 2022-10-06

## 2022-10-06 DIAGNOSIS — F41.9 ANXIETY: ICD-10-CM

## 2022-10-06 NOTE — TELEPHONE ENCOUNTER
Caller: Talisha Hernandes    Relationship: Self    Best call back number: [phone]    What is the best time to reach you:ANYTIME    Who are you requesting to speak with (clinical staff, provider,  specific staff member): CLINICAL STAFF    Do you know the name of the person who called:SELF    What was the call regarding:PATIENT CALLED AND STATED SHE WOULD LIKE 2  MEDICATIONS BUSPIRONE 15MG, VIIBRYD 20MG  TO BE FILLED THROUGH [address] [place] [address]- [phone]  Do you require a callback: YES

## 2022-10-07 RX ORDER — VILAZODONE HYDROCHLORIDE 20 MG/1
20 TABLET ORAL DAILY
Qty: 30 TABLET | Refills: 5 | Status: SHIPPED | OUTPATIENT
Start: 2022-10-07 | End: 2022-11-28 | Stop reason: SDUPTHER

## 2022-10-07 RX ORDER — BUSPIRONE HYDROCHLORIDE 15 MG/1
TABLET ORAL
Qty: 180 TABLET | Refills: 3 | Status: SHIPPED | OUTPATIENT
Start: 2022-10-07

## 2022-11-23 NOTE — PROGRESS NOTES
Subjective   Talisha Hernandes is a 27 y.o. female.     Chief Complaint   Patient presents with   • Anxiety       Anxiety  Presents for follow-up visit. Symptoms include depressed mood, insomnia, irritability, nausea, nervous/anxious behavior and shortness of breath. Patient reports no chest pain, decreased concentration, dizziness, palpitations or suicidal ideas. The severity of symptoms is moderate. The quality of sleep is fair. Nighttime awakenings: several.     Compliance with medications is %.            I personally reviewed and updated the patient's allergies, medications, problem list, and past medical, surgical, social, and family history. I have reviewed and confirmed the accuracy of the History of Present Illness and Review of Symptoms as documented by the MA/LPN/RN. Chad Mcgee MD    Family History   Problem Relation Age of Onset   • Cancer Mother         lymphoma   • Hypertension Father        Social History     Tobacco Use   • Smoking status: Former     Packs/day: 0.00     Years: 0.00     Pack years: 0.00     Types: Cigarettes     Start date: 2016     Quit date: 2016     Years since quittin.9   • Smokeless tobacco: Never   • Tobacco comments:     smoked one cigarette twice a month   Vaping Use   • Vaping Use: Never used   Substance Use Topics   • Alcohol use: Yes     Comment: Occasional    • Drug use: Never       Past Surgical History:   Procedure Laterality Date   • TONSILLECTOMY         Patient Active Problem List   Diagnosis   • Anxiety   • Class 2 severe obesity due to excess calories with serious comorbidity and body mass index (BMI) of 36.0 to 36.9 in adult (HCC)   • Annual visit for general adult medical examination with abnormal findings   • Former tobacco use   • Other fatigue         Current Outpatient Medications:   •  buPROPion XL (WELLBUTRIN XL) 150 MG 24 hr tablet, Take 1 tablet by mouth Daily., Disp: 90 tablet, Rfl: 3  •  Viibryd 20 MG tablet tablet, Take 1 tablet by  "mouth Daily., Disp: 90 tablet, Rfl: 3  •  busPIRone (BUSPAR) 15 MG tablet, TAKE 1 TABLET 2-3 TIMES    DAILY, Disp: 180 tablet, Rfl: 3  •  clindamycin (CLEOCIN T) 1 % external solution, Apply  topically to the appropriate area as directed Daily As Needed (rash)., Disp: 60 mL, Rfl: 5  •  propranolol (INDERAL) 10 MG tablet, Take 1 tablet daily as needed, Disp: 90 tablet, Rfl: 3         Review of Systems   Constitutional: Positive for irritability.   Respiratory: Positive for shortness of breath.    Cardiovascular: Negative for chest pain and palpitations.   Gastrointestinal: Positive for nausea.   Neurological: Negative for dizziness.   Psychiatric/Behavioral: Positive for depressed mood. Negative for decreased concentration and suicidal ideas. The patient is nervous/anxious and has insomnia.        I have reviewed and confirmed the accuracy of the ROS as documented by the MA/LPN/RN Chad Mcgee MD      Objective   /78 (BP Location: Right arm, Patient Position: Sitting, Cuff Size: Adult)   Pulse 73   Temp 98.4 °F (36.9 °C)   Resp 16   Ht 162.6 cm (64\")   Wt 95.4 kg (210 lb 6.4 oz)   SpO2 99%   BMI 36.12 kg/m²   BP Readings from Last 3 Encounters:   11/28/22 120/78   08/01/22 120/82   01/26/22 122/82     Wt Readings from Last 3 Encounters:   11/28/22 95.4 kg (210 lb 6.4 oz)   08/01/22 101 kg (223 lb 3.2 oz)   01/26/22 103 kg (227 lb 6.4 oz)     Physical Exam    Data / Lab Results:    No results found for: HGBA1C     Lab Results   Component Value Date    LDL 85 01/27/2022    LDL 73 06/03/2020     No results found for: CHOL  Lab Results   Component Value Date    TRIG 38 01/27/2022    TRIG 52 06/03/2020     Lab Results   Component Value Date    HDL 76 01/27/2022    HDL 75 06/03/2020     No results found for: PSA  Lab Results   Component Value Date    WBC 9.0 01/27/2022    HGB 14.1 01/27/2022    HCT 41.7 01/27/2022    MCV 87 01/27/2022     01/27/2022     Lab Results   Component Value Date    TSH 1.210 " 01/27/2022      Lab Results   Component Value Date    GLUCOSE 88 01/27/2022    BUN 10 01/27/2022    CREATININE 0.68 01/27/2022    EGFRIFNONA 121 01/27/2022    EGFRIFAFRI 140 01/27/2022    BCR 15 01/27/2022    K 4.4 01/27/2022    CO2 23 01/27/2022    CALCIUM 9.4 01/27/2022    PROTENTOTREF 6.7 01/27/2022    ALBUMIN 4.5 01/27/2022    LABIL2 2.0 01/27/2022    AST 20 01/27/2022    ALT 20 01/27/2022     No results found for: NESHA, RF, SEDRATE   No results found for: CRP   No results found for: IRON, TIBC, FERRITIN   Lab Results   Component Value Date    ZREXCYRG77 455 01/27/2022          Assessment & Plan      Medications        Problem List         LOS      Health maintenance.  Doing well, vaccines current.  Discussed health maintenance, screening test, lifestyle modification.  Anxiety/depression..  Improved on as needed propranolol,  BuSpar, Viibryd, followed by psychiatry in the past.  Sleep latency improved with melatonin..     Improved on Wellbutrin.  Follow-up recheck  Screening for cervical cancer.  followed by OB/GYN, has had Mirena in the past..   Has had negative eval for PCOS.    Tobacco use.    Congrats on quitting.  Screening for hyperlipidemia.  Recheck fasting labs.  Fatigue.    Improved with taking Viibryd at night.  DDx includes RALPH, consider sleep study if recurrantsymptoms.  Follow-up recheck.  Easy bruising.  Benign exam today.  CBC benign.  Acne.  Stable on clindamycin.  Obesity.  Has been working hard on diet and exercise.    Improved on Wellbutrin, has lost weight.  Follow-up recheck.        Diagnoses and all orders for this visit:    1. Anxiety (Primary)  -     buPROPion XL (WELLBUTRIN XL) 150 MG 24 hr tablet; Take 1 tablet by mouth Daily.  Dispense: 90 tablet; Refill: 3  -     Viibryd 20 MG tablet tablet; Take 1 tablet by mouth Daily.  Dispense: 90 tablet; Refill: 3    2. Class 2 severe obesity due to excess calories with serious comorbidity and body mass index (BMI) of 36.0 to 36.9 in adult  (Formerly [facility])  Assessment & Plan:  Patient's (Body mass index is 36.12 kg/m².) indicates that they are obese (BMI >30) with health conditions that include none . Weight is unchanged. BMI is is above average; BMI management plan is completed. We discussed portion control and increasing exercise.       3. Former tobacco use    4. Need for influenza vaccination  -     FluLaval/Fluarix/Fluzone >6 Months            Expected course, medications, and adverse effects discussed.  Call or return if worsening or persistent symptoms.  I wore protective equipment throughout this patient encounter including a mask, gloves, and eye protection.  Hand hygiene was performed before donning protective equipment and after removal when leaving the room. The complete contents of the Assessment and Plan and Data/Lab Results as documented above have been reviewed and addressed by myself with the patient today as part of an ongoing evaluation / treatment plan.  If some of the documentation has been copied from a previous note and is unchanged it indicates that this problem / plan has been assessed today but is stable from a previous visit and no changes have been recommended.

## 2022-11-28 ENCOUNTER — OFFICE VISIT (OUTPATIENT)
Dept: FAMILY MEDICINE CLINIC | Facility: CLINIC | Age: 27
End: 2022-11-28

## 2022-11-28 VITALS
DIASTOLIC BLOOD PRESSURE: 78 MMHG | OXYGEN SATURATION: 99 % | TEMPERATURE: 98.4 F | RESPIRATION RATE: 16 BRPM | WEIGHT: 210.4 LBS | BODY MASS INDEX: 35.92 KG/M2 | SYSTOLIC BLOOD PRESSURE: 120 MMHG | HEART RATE: 73 BPM | HEIGHT: 64 IN

## 2022-11-28 DIAGNOSIS — Z87.891 FORMER TOBACCO USE: ICD-10-CM

## 2022-11-28 DIAGNOSIS — E66.01 CLASS 2 SEVERE OBESITY DUE TO EXCESS CALORIES WITH SERIOUS COMORBIDITY AND BODY MASS INDEX (BMI) OF 36.0 TO 36.9 IN ADULT: ICD-10-CM

## 2022-11-28 DIAGNOSIS — Z23 NEED FOR INFLUENZA VACCINATION: ICD-10-CM

## 2022-11-28 DIAGNOSIS — F41.9 ANXIETY: Primary | ICD-10-CM

## 2022-11-28 PROCEDURE — 90686 IIV4 VACC NO PRSV 0.5 ML IM: CPT | Performed by: FAMILY MEDICINE

## 2022-11-28 PROCEDURE — 90471 IMMUNIZATION ADMIN: CPT | Performed by: FAMILY MEDICINE

## 2022-11-28 PROCEDURE — 99213 OFFICE O/P EST LOW 20 MIN: CPT | Performed by: FAMILY MEDICINE

## 2022-11-28 RX ORDER — BUPROPION HYDROCHLORIDE 150 MG/1
150 TABLET ORAL DAILY
Qty: 90 TABLET | Refills: 3 | Status: SHIPPED | OUTPATIENT
Start: 2022-11-28

## 2022-11-28 RX ORDER — VILAZODONE HYDROCHLORIDE 20 MG/1
20 TABLET ORAL DAILY
Qty: 90 TABLET | Refills: 3 | Status: SHIPPED | OUTPATIENT
Start: 2022-11-28

## 2022-11-28 NOTE — ASSESSMENT & PLAN NOTE
Patient's (Body mass index is 36.12 kg/m².) indicates that they are obese (BMI >30) with health conditions that include none . Weight is unchanged. BMI is is above average; BMI management plan is completed. We discussed portion control and increasing exercise.

## 2022-12-23 ENCOUNTER — TELEPHONE (OUTPATIENT)
Dept: FAMILY MEDICINE CLINIC | Facility: CLINIC | Age: 27
End: 2022-12-23
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
## 2023-01-03 ENCOUNTER — OFFICE VISIT (OUTPATIENT)
Dept: FAMILY MEDICINE CLINIC | Facility: CLINIC | Age: 28
End: 2023-01-03
Payer: COMMERCIAL

## 2023-01-03 VITALS
TEMPERATURE: 97.5 F | BODY MASS INDEX: 36.12 KG/M2 | OXYGEN SATURATION: 98 % | RESPIRATION RATE: 18 BRPM | DIASTOLIC BLOOD PRESSURE: 72 MMHG | WEIGHT: 211.6 LBS | HEIGHT: 64 IN | SYSTOLIC BLOOD PRESSURE: 116 MMHG | HEART RATE: 83 BPM

## 2023-01-03 DIAGNOSIS — J06.9 ACUTE URI: ICD-10-CM

## 2023-01-03 DIAGNOSIS — Z09 ENCOUNTER FOR EXAMINATION FOLLOWING TREATMENT AT HOSPITAL: Primary | ICD-10-CM

## 2023-01-03 DIAGNOSIS — M54.9 BILATERAL BACK PAIN, UNSPECIFIED BACK LOCATION, UNSPECIFIED CHRONICITY: ICD-10-CM
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
## 2023-01-03 DIAGNOSIS — E66.01 CLASS 2 SEVERE OBESITY DUE TO EXCESS CALORIES WITH SERIOUS COMORBIDITY AND BODY MASS INDEX (BMI) OF 36.0 TO 36.9 IN ADULT: ICD-10-CM

## 2023-01-03 DIAGNOSIS — Z87.891 FORMER TOBACCO USE: ICD-10-CM

## 2023-01-03 PROCEDURE — 99213 OFFICE O/P EST LOW 20 MIN: CPT | Performed by: FAMILY MEDICINE

## 2023-01-03 RX ORDER — PREDNISONE 1 MG/1
TABLET ORAL
Qty: 45 TABLET | Refills: 0 | Status: SHIPPED | OUTPATIENT
Start: 2023-01-03

## 2023-01-03 RX ORDER — AZITHROMYCIN 250 MG/1
TABLET, FILM COATED ORAL
Qty: 6 TABLET | Refills: 0 | Status: SHIPPED | OUTPATIENT
Start: 2023-01-03

## 2023-01-03 RX ORDER — NAPROXEN 500 MG/1
500 TABLET ORAL 2 TIMES DAILY
COMMUNITY
Start: 2022-12-23

## 2023-01-03 RX ORDER — METHOCARBAMOL 500 MG/1
500 TABLET, FILM COATED ORAL 4 TIMES DAILY PRN
COMMUNITY
Start: 2022-12-23

## 2023-01-03 NOTE — ASSESSMENT & PLAN NOTE
Patient's (Body mass index is 36.32 kg/m².) indicates that they are morbidly/severely obese (BMI > 40 or > 35 with obesity - related health condition) with health conditions that include none . Weight is unchanged. BMI is is above average; BMI management plan is completed. We discussed portion control and increasing exercise.

## 2023-01-03 NOTE — LETTER
January 3, 2023     Patient: Talisha Hernandes   YOB: 1995   Date of Visit: 1/3/2023       To Whom It May Concern:    It is my medical opinion that Talisha Hernandes may return to work on 01/04/2023 with the following restrictions, no lifting greater than 20lbs or repeative lifting for 7 days. May return to full duty on 01/11/2023.            Sincerely,      Chad Mcgee MD

## 2023-03-10 ENCOUNTER — TELEPHONE (OUTPATIENT)
Dept: FAMILY MEDICINE CLINIC | Facility: CLINIC | Age: 28
End: 2023-03-10
Payer: COMMERCIAL

## 2023-03-10 DIAGNOSIS — F41.9 ANXIETY: ICD-10-CM

## 2023-03-10 RX ORDER — VILAZODONE HYDROCHLORIDE 20 MG/1
20 TABLET ORAL DAILY
Qty: 90 TABLET | Refills: 3 | Status: SHIPPED | OUTPATIENT
Start: 2023-03-10

## 2023-03-10 NOTE — TELEPHONE ENCOUNTER
Provider: DR. GODWIN     Caller: SINDY SANTANA     Relationship to Patient: SELF     Pharmacy: [facility]/pharmacy #[id number] [address] [phone] [facility] [phone]     Phone Number: [phone]    Reason for Call: PATIENT IS REQUESTING TO HAVE THE Viibryd 20 MG tablet tablet CHANGED TO THE GENERIC BRAND. PATIENT STATES THE INSURANCE DOES NOT LIKE THE Viibryd 20 MG tablet tablet AND IS NEEDING A PRIOR AUTHORIZATION TO FILL THE GENERIC MEDICATION.

## 2023-06-04 DIAGNOSIS — F41.9 ANXIETY: ICD-10-CM

## 2023-06-05 RX ORDER — BUSPIRONE HYDROCHLORIDE 15 MG/1
TABLET ORAL
Qty: 270 TABLET | Refills: 2 | Status: SHIPPED | OUTPATIENT
Start: 2023-06-05